# Patient Record
Sex: MALE | URBAN - METROPOLITAN AREA
[De-identification: names, ages, dates, MRNs, and addresses within clinical notes are randomized per-mention and may not be internally consistent; named-entity substitution may affect disease eponyms.]

---

## 2022-06-28 ENCOUNTER — OFFICE VISIT (OUTPATIENT)
Dept: URBAN - METROPOLITAN AREA CLINIC 88 | Facility: CLINIC | Age: 72
End: 2022-06-28
Payer: MEDICARE

## 2022-06-28 DIAGNOSIS — H53.022 REFRACTIVE AMBLYOPIA, LEFT EYE: ICD-10-CM

## 2022-06-28 DIAGNOSIS — H52.4 PRESBYOPIA: ICD-10-CM

## 2022-06-28 DIAGNOSIS — H25.813 COMBINED FORMS OF AGE-RELATED CATARACT, BILATERAL: ICD-10-CM

## 2022-06-28 DIAGNOSIS — E11.9 TYPE 2 DIABETES MELLITUS WITHOUT COMPLICATIONS: Primary | ICD-10-CM

## 2022-06-28 DIAGNOSIS — H50.9 UNSPECIFIED STRABISMUS: ICD-10-CM

## 2022-06-28 PROCEDURE — 99203 OFFICE O/P NEW LOW 30 MIN: CPT | Performed by: OPHTHALMOLOGY

## 2022-06-28 ASSESSMENT — INTRAOCULAR PRESSURE
OD: 13
OS: 13

## 2022-06-28 ASSESSMENT — VISUAL ACUITY
OS: 20/200
OD: 20/30

## 2022-06-28 NOTE — IMPRESSION/PLAN
Impression: Unspecified strabismus: H50.9. Plan: Hx of Strabismus surgery? Patient does not recall what type of surgery he had.

## 2022-06-28 NOTE — IMPRESSION/PLAN
Impression: Type 2 diabetes mellitus without complications: A50.5. Plan: Reviewed with patient that no retinal vascular changes are occurring from diabetes. Patient to continue care with current medication provider for diabetes management. Importance of retinal exams reviewed. Will continue to observe with dilated examination in 12 months.

## 2022-06-28 NOTE — IMPRESSION/PLAN
Impression: Refractive amblyopia, left eye: H53.022. Plan: Longstanding condition since a child. Observe.